# Patient Record
Sex: FEMALE | Employment: FULL TIME | URBAN - METROPOLITAN AREA
[De-identification: names, ages, dates, MRNs, and addresses within clinical notes are randomized per-mention and may not be internally consistent; named-entity substitution may affect disease eponyms.]

---

## 2017-12-12 PROBLEM — C43.71 MALIGNANT MELANOMA OF RIGHT LOWER EXTREMITY INCLUDING HIP (HCC): Status: ACTIVE | Noted: 2017-12-12

## 2017-12-12 PROCEDURE — 88175 CYTOPATH C/V AUTO FLUID REDO: CPT | Performed by: OBSTETRICS & GYNECOLOGY

## 2018-01-15 PROBLEM — R87.612 PAPANICOLAOU SMEAR OF CERVIX WITH LOW GRADE SQUAMOUS INTRAEPITHELIAL LESION (LGSIL): Status: ACTIVE | Noted: 2018-01-15

## 2018-01-15 PROCEDURE — 88305 TISSUE EXAM BY PATHOLOGIST: CPT | Performed by: OBSTETRICS & GYNECOLOGY

## 2018-01-22 PROBLEM — R87.612 PAPANICOLAOU SMEAR OF CERVIX WITH LOW GRADE SQUAMOUS INTRAEPITHELIAL LESION (LGSIL): Status: RESOLVED | Noted: 2018-01-15 | Resolved: 2018-01-22

## 2018-01-22 PROBLEM — N87.0 DYSPLASIA OF CERVIX, LOW GRADE (CIN 1): Status: ACTIVE | Noted: 2018-01-22

## 2019-07-01 PROCEDURE — 88175 CYTOPATH C/V AUTO FLUID REDO: CPT | Performed by: OBSTETRICS & GYNECOLOGY

## 2020-01-20 PROBLEM — M81.8 OTHER OSTEOPOROSIS WITHOUT CURRENT PATHOLOGICAL FRACTURE: Status: ACTIVE | Noted: 2020-01-20

## 2021-03-01 PROBLEM — M81.0 AGE-RELATED OSTEOPOROSIS WITHOUT CURRENT PATHOLOGICAL FRACTURE: Status: ACTIVE | Noted: 2021-03-01

## 2022-10-03 ENCOUNTER — WALK IN (OUTPATIENT)
Dept: URGENT CARE | Age: 67
End: 2022-10-03

## 2022-10-03 VITALS
BODY MASS INDEX: 21.99 KG/M2 | HEART RATE: 65 BPM | SYSTOLIC BLOOD PRESSURE: 118 MMHG | RESPIRATION RATE: 18 BRPM | WEIGHT: 132 LBS | OXYGEN SATURATION: 97 % | DIASTOLIC BLOOD PRESSURE: 80 MMHG | HEIGHT: 65 IN | TEMPERATURE: 97 F

## 2022-10-03 DIAGNOSIS — J06.9 UPPER RESPIRATORY TRACT INFECTION, UNSPECIFIED TYPE: Primary | ICD-10-CM

## 2022-10-03 LAB
FLUAV AG UPPER RESP QL IA.RAPID: NEGATIVE
FLUBV AG UPPER RESP QL IA.RAPID: NEGATIVE
INTERNAL PROCEDURAL CONTROLS ACCEPTABLE: YES
S PYO AG THROAT QL IA.RAPID: NEGATIVE
SARS-COV+SARS-COV-2 AG RESP QL IA.RAPID: NOT DETECTED
TEST LOT EXPIRATION DATE: NORMAL
TEST LOT EXPIRATION DATE: NORMAL
TEST LOT NUMBER: NORMAL
TEST LOT NUMBER: NORMAL

## 2022-10-03 PROCEDURE — 87428 SARSCOV & INF VIR A&B AG IA: CPT | Performed by: NURSE PRACTITIONER

## 2022-10-03 PROCEDURE — 87880 STREP A ASSAY W/OPTIC: CPT | Performed by: NURSE PRACTITIONER

## 2022-10-03 PROCEDURE — 99213 OFFICE O/P EST LOW 20 MIN: CPT | Performed by: NURSE PRACTITIONER

## 2022-10-03 RX ORDER — ROSUVASTATIN CALCIUM 5 MG/1
TABLET, COATED ORAL
COMMUNITY
Start: 2022-08-30

## 2022-10-03 RX ORDER — HYDROCHLOROTHIAZIDE 25 MG/1
25 TABLET ORAL
COMMUNITY
Start: 2022-04-21

## 2022-10-03 RX ORDER — PANTOPRAZOLE SODIUM 20 MG/1
20 TABLET, DELAYED RELEASE ORAL
COMMUNITY
Start: 2022-04-21

## 2022-10-03 RX ORDER — SERTRALINE HYDROCHLORIDE 100 MG/1
TABLET, FILM COATED ORAL
COMMUNITY
Start: 2022-08-30

## 2022-10-03 RX ORDER — BUPROPION HYDROCHLORIDE 150 MG/1
150 TABLET ORAL DAILY
COMMUNITY

## 2022-10-03 RX ORDER — LOSARTAN POTASSIUM 100 MG/1
100 TABLET ORAL
COMMUNITY
Start: 2022-04-21

## 2022-10-03 ASSESSMENT — ENCOUNTER SYMPTOMS
ACTIVITY CHANGE: 0
RHINORRHEA: 1
CHILLS: 0
DIZZINESS: 0
CHEST TIGHTNESS: 0
WHEEZING: 0
EYE DISCHARGE: 0
FACIAL SWELLING: 0
SORE THROAT: 1
BACK PAIN: 0
LIGHT-HEADEDNESS: 0
ABDOMINAL PAIN: 0
FEVER: 0
SINUS PRESSURE: 1
SINUS PAIN: 1
DIARRHEA: 0
COLOR CHANGE: 0
APPETITE CHANGE: 1
NAUSEA: 0
HEADACHES: 1
EYE REDNESS: 0
COUGH: 1
FATIGUE: 0
TROUBLE SWALLOWING: 0
EYE ITCHING: 0
DIAPHORESIS: 0
EYE PAIN: 0
VOMITING: 0
WEAKNESS: 0
SHORTNESS OF BREATH: 0
PHOTOPHOBIA: 0

## 2025-04-29 RX ORDER — ALPRAZOLAM 0.5 MG
0.5 TABLET ORAL
COMMUNITY
Start: 2024-08-14

## 2025-04-29 RX ORDER — PANTOPRAZOLE SODIUM 40 MG/1
40 TABLET, DELAYED RELEASE ORAL DAILY
COMMUNITY
Start: 2023-04-14

## 2025-04-29 RX ORDER — LOSARTAN POTASSIUM 100 MG/1
100 TABLET ORAL DAILY
COMMUNITY
Start: 2024-07-08

## 2025-04-29 RX ORDER — HYDROCHLOROTHIAZIDE 25 MG/1
25 TABLET ORAL DAILY
COMMUNITY
Start: 2024-07-06

## 2025-04-29 RX ORDER — THIAMINE HCL 100 MG
1 TABLET ORAL DAILY
COMMUNITY

## 2025-04-30 NOTE — DISCHARGE INSTRUCTIONS
HOME INSTRUCTIONS  [facility] HOME CARE INSTRUCTIONS: POST-OP ANESTHESIA  The medication that you received for sedation or general anesthesia can last up to 24 hours. Your judgment and reflexes may be altered, even if you feel like your normal self.      We Recommend:   Do not drive any motor vehicle or bicycle   Avoid mowing the lawn, playing sports, or working with power tools/applicances (power saws, electric knives or mixers)   That you have someone stay with you on your first night home   Do not drink alcohol or take sleeping pills or tranquilizers   Do not sign legal documents within 24 hours of your procedure   If you had a nerve block for your surgery, take extra care not to put any pressure on your arm or hand for 24 hours    It is normal:  For you to have a sore throat if you had a breathing tube during surgery (while you were asleep!). The sore throat should get better within 48 hours. You can gargle with warm salt water (1/2 tsp in 4 oz warm water) or use a throat lozenge for comfort  To feel muscle aches or soreness especially in the abdomen, chest or neck. The achy feeling should go away in the next 24 hours  To feel weak, sleepy or \"wiped out\". Your should start feeling better in the next 24 hours.   To experience mild discomforts such as sore lip or tongue, headache, cramps, gas pains or a bloated feeling in your abdomen.   To experience mild back pain or soreness for a day or two if you had spinal or epidural anesthesia.   If you had laparoscopic surgery, to feel shoulder pain or discomfort on the day of surgery.   For some patients to have nausea after surgery/anesthesia    If you feel nausea or experience vomiting:   Try to move around less.   Eat less than usual or drink only liquids until the next morning   Nausea should resolve in about 24 hours    If you have a problem when you are at home:    Call your surgeons office   Discharge Instructions: After Your Surgery  You’ve just had surgery. During  surgery, you were given medicine called anesthesia to keep you relaxed and free of pain. After surgery, you may have some pain or nausea. This is common. Here are some tips for feeling better and getting well after surgery.   Going home  Your healthcare provider will show you how to take care of yourself when you go home. They'll also answer your questions. Have an adult family member or friend drive you home. For the first 24 hours after your surgery:   Don't drive or use heavy equipment.  Don't make important decisions or sign legal papers.  Take medicines as directed.  Don't drink alcohol.  Have someone stay with you, if needed. They can watch for problems and help keep you safe.  Be sure to go to all follow-up visits with your healthcare provider. And rest after your surgery for as long as your provider tells you to.   Coping with pain  If you have pain after surgery, pain medicine will help you feel better. Take it as directed, before pain becomes severe. Also, ask your healthcare provider or pharmacist about other ways to control pain. This might be with heat, ice, or relaxation. And follow any other instructions your surgeon or nurse gives you.      Stay on schedule with your medicine.     Tips for taking pain medicine  To get the best relief possible, remember these points:   Pain medicines can upset your stomach. Taking them with a little food may help.  Most pain relievers taken by mouth need at least 20 to 30 minutes to start to work.  Don't wait till your pain becomes severe before you take your medicine. Try to time your medicine so that you can take it before starting an activity. This might be before you get dressed, go for a walk, or sit down for dinner.  Constipation is a common side effect of some pain medicines. Call your healthcare provider before taking any medicines, such as laxatives or stool softeners, to help ease constipation. Also ask if you should skip any foods. Drinking lots of fluids  and eating foods, such as fruits and vegetables, that are high in fiber can also help. Remember, don't take laxatives unless your surgeon has prescribed them.  Drinking alcohol and taking pain medicine can cause dizziness and slow your breathing. It can even be deadly. Don't drink alcohol while taking pain medicine.  Pain medicine can make you react more slowly to things. Don't drive or run machinery while taking pain medicine.  Your healthcare provider may tell you to take acetaminophen to help ease your pain. Ask them how much you're supposed to take each day. Acetaminophen or other pain relievers may interact with your prescription medicines or other over-the-counter (OTC) medicines. Some prescription medicines have acetaminophen and other ingredients in them. Using both prescription and OTC acetaminophen for pain can cause you to accidentally overdose. Read the labels on your OTC medicines with care. This will help you to clearly know the list of ingredients, how much to take, and any warnings. It may also help you not take too much acetaminophen. If you have questions or don't understand the information, ask your pharmacist or healthcare provider to explain it to you before you take the OTC medicine.   Managing nausea  Some people have an upset stomach (nausea) after surgery. This is often because of anesthesia, pain, or pain medicine, less movement of food in the stomach, or the stress of surgery. These tips will help you handle nausea and eat healthy foods as you get better. If you were on a special food plan before surgery, ask your healthcare provider if you should follow it while you get better. Check with your provider on how your eating should progress. It may depend on the surgery you had. These general tips may help:   Don't push yourself to eat. Your body will tell you when to eat and how much.  Start off with clear liquids and soup. They're easier to digest.  Next try semi-solid foods as you feel  ready. These include mashed potatoes, applesauce, and gelatin.  Slowly move to solid foods. Don’t eat fatty, rich, or spicy foods at first.  Don't force yourself to have 3 large meals a day. Instead eat smaller amounts more often.  Take pain medicines with a small amount of solid food, such as crackers or toast. This helps prevent nausea.  When to call your healthcare provider  Call your healthcare provider right away if you have any of these:   You still have too much pain, or the pain gets worse, after taking the medicine. The medicine may not be strong enough. Or there may be a complication from the surgery.  You feel too sleepy, dizzy, or groggy. The medicine may be too strong.  Side effects, such as nausea or vomiting. Your healthcare provider may advise taking other medicines to treat these or may change your treatment plan..  Skin changes, such as rash, itching, or hives. This may mean you have an allergic reaction. Your provider may advise taking other medicines.  The incision looks different (for instance, part of it opens up).  Bleeding or fluid leaking from the incision site, and you weren't told to expect that.  Fever of 100.4°F (38°C) or higher, or as directed by your healthcare provider.  Call 911  Call 911 right away if you have:   Trouble breathing  Facial swelling    If you have obstructive sleep apnea   You were given anesthesia medicine during surgery to keep you comfortable and free of pain. After surgery, you may have more apnea spells because of this medicine and other medicines you were given. The spells may last longer than normal.    At home:  Keep using the continuous positive airway pressure (CPAP) device when you sleep. Unless your healthcare provider tells you not to, use it when you sleep, day or night. CPAP is a common device used to treat obstructive sleep apnea.  Talk with your provider before taking any pain medicine, muscle relaxants, or sedatives. Your provider will tell you about  the possible dangers of taking these medicines.  Contact your provider if your sleeping changes a lot even when taking medicines as directed.  [employer] last reviewed this educational content on 4/1/2024  This information is for informational purposes only. This is not intended to be a substitute for professional medical advice, diagnosis, or treatment. Always seek the advice and follow the directions from your physician or other qualified health care provider.  © 0245-0719 [employer]. All rights reserved. This information is not intended as a substitute for professional medical care. Always follow your healthcare professional's instructions.

## 2025-05-01 ENCOUNTER — ANESTHESIA EVENT (OUTPATIENT)
Dept: SURGERY | Facility: HOSPITAL | Age: 70
End: 2025-05-01
Payer: MEDICARE

## 2025-05-01 ENCOUNTER — HOSPITAL ENCOUNTER (OUTPATIENT)
Facility: HOSPITAL | Age: 70
Setting detail: HOSPITAL OUTPATIENT SURGERY
Discharge: HOME | End: 2025-05-01
Attending: ORTHOPAEDIC SURGERY | Admitting: ORTHOPAEDIC SURGERY
Payer: MEDICARE

## 2025-05-01 ENCOUNTER — ANESTHESIA (OUTPATIENT)
Dept: SURGERY | Facility: HOSPITAL | Age: 70
End: 2025-05-01
Payer: MEDICARE

## 2025-05-01 VITALS
HEART RATE: 53 BPM | TEMPERATURE: 97 F | HEIGHT: 64 IN | WEIGHT: 140 LBS | DIASTOLIC BLOOD PRESSURE: 42 MMHG | RESPIRATION RATE: 14 BRPM | SYSTOLIC BLOOD PRESSURE: 151 MMHG | OXYGEN SATURATION: 97 % | BODY MASS INDEX: 23.9 KG/M2

## 2025-05-01 PROCEDURE — 64415 NJX AA&/STRD BRCH PLXS IMG: CPT | Performed by: ORTHOPAEDIC SURGERY

## 2025-05-01 RX ORDER — ONDANSETRON 2 MG/ML
4 INJECTION INTRAMUSCULAR; INTRAVENOUS EVERY 6 HOURS PRN
Status: DISCONTINUED | OUTPATIENT
Start: 2025-05-01 | End: 2025-05-01

## 2025-05-01 RX ORDER — HYDROMORPHONE HYDROCHLORIDE 1 MG/ML
0.4 INJECTION, SOLUTION INTRAMUSCULAR; INTRAVENOUS; SUBCUTANEOUS EVERY 2 HOUR PRN
Refills: 0 | Status: DISCONTINUED | OUTPATIENT
Start: 2025-05-01 | End: 2025-05-01

## 2025-05-01 RX ORDER — OXYCODONE HYDROCHLORIDE 5 MG/1
5 TABLET ORAL EVERY 4 HOURS PRN
Refills: 0 | Status: DISCONTINUED | OUTPATIENT
Start: 2025-05-01 | End: 2025-05-01

## 2025-05-01 RX ORDER — MIDAZOLAM HYDROCHLORIDE 1 MG/ML
INJECTION INTRAMUSCULAR; INTRAVENOUS AS NEEDED
Status: DISCONTINUED | OUTPATIENT
Start: 2025-05-01 | End: 2025-05-01 | Stop reason: SURG

## 2025-05-01 RX ORDER — MORPHINE SULFATE 10 MG/ML
6 INJECTION, SOLUTION INTRAMUSCULAR; INTRAVENOUS EVERY 10 MIN PRN
Status: DISCONTINUED | OUTPATIENT
Start: 2025-05-01 | End: 2025-05-01

## 2025-05-01 RX ORDER — HYDROMORPHONE HYDROCHLORIDE 1 MG/ML
0.8 INJECTION, SOLUTION INTRAMUSCULAR; INTRAVENOUS; SUBCUTANEOUS EVERY 2 HOUR PRN
Refills: 0 | Status: DISCONTINUED | OUTPATIENT
Start: 2025-05-01 | End: 2025-05-01

## 2025-05-01 RX ORDER — MAGNESIUM HYDROXIDE 1200 MG/15ML
LIQUID ORAL CONTINUOUS PRN
Status: DISCONTINUED | OUTPATIENT
Start: 2025-05-01 | End: 2025-05-01

## 2025-05-01 RX ORDER — MORPHINE SULFATE 4 MG/ML
4 INJECTION, SOLUTION INTRAMUSCULAR; INTRAVENOUS EVERY 10 MIN PRN
Status: DISCONTINUED | OUTPATIENT
Start: 2025-05-01 | End: 2025-05-01

## 2025-05-01 RX ORDER — SODIUM CHLORIDE, SODIUM LACTATE, POTASSIUM CHLORIDE, CALCIUM CHLORIDE 600; 310; 30; 20 MG/100ML; MG/100ML; MG/100ML; MG/100ML
INJECTION, SOLUTION INTRAVENOUS CONTINUOUS
Status: DISCONTINUED | OUTPATIENT
Start: 2025-05-01 | End: 2025-05-01

## 2025-05-01 RX ORDER — OXYCODONE HYDROCHLORIDE 5 MG/1
10 TABLET ORAL EVERY 4 HOURS PRN
Refills: 0 | Status: DISCONTINUED | OUTPATIENT
Start: 2025-05-01 | End: 2025-05-01

## 2025-05-01 RX ORDER — HYDROMORPHONE HYDROCHLORIDE 1 MG/ML
0.6 INJECTION, SOLUTION INTRAMUSCULAR; INTRAVENOUS; SUBCUTANEOUS EVERY 5 MIN PRN
Status: DISCONTINUED | OUTPATIENT
Start: 2025-05-01 | End: 2025-05-01

## 2025-05-01 RX ORDER — HYDROMORPHONE HYDROCHLORIDE 1 MG/ML
0.2 INJECTION, SOLUTION INTRAMUSCULAR; INTRAVENOUS; SUBCUTANEOUS EVERY 5 MIN PRN
Status: DISCONTINUED | OUTPATIENT
Start: 2025-05-01 | End: 2025-05-01

## 2025-05-01 RX ORDER — MORPHINE SULFATE 4 MG/ML
2 INJECTION, SOLUTION INTRAMUSCULAR; INTRAVENOUS EVERY 10 MIN PRN
Status: DISCONTINUED | OUTPATIENT
Start: 2025-05-01 | End: 2025-05-01

## 2025-05-01 RX ORDER — NALOXONE HYDROCHLORIDE 0.4 MG/ML
0.08 INJECTION, SOLUTION INTRAMUSCULAR; INTRAVENOUS; SUBCUTANEOUS AS NEEDED
Status: DISCONTINUED | OUTPATIENT
Start: 2025-05-01 | End: 2025-05-01

## 2025-05-01 RX ORDER — ROPIVACAINE HYDROCHLORIDE 5 MG/ML
INJECTION, SOLUTION EPIDURAL; INFILTRATION; PERINEURAL
Status: COMPLETED | OUTPATIENT
Start: 2025-05-01 | End: 2025-05-01

## 2025-05-01 RX ORDER — KETAMINE HYDROCHLORIDE 50 MG/ML
INJECTION, SOLUTION INTRAMUSCULAR; INTRAVENOUS AS NEEDED
Status: DISCONTINUED | OUTPATIENT
Start: 2025-05-01 | End: 2025-05-01 | Stop reason: SURG

## 2025-05-01 RX ORDER — ACETAMINOPHEN 500 MG
1000 TABLET ORAL ONCE
Status: COMPLETED | OUTPATIENT
Start: 2025-05-01 | End: 2025-05-01

## 2025-05-01 RX ORDER — HYDROMORPHONE HYDROCHLORIDE 1 MG/ML
0.4 INJECTION, SOLUTION INTRAMUSCULAR; INTRAVENOUS; SUBCUTANEOUS EVERY 5 MIN PRN
Status: DISCONTINUED | OUTPATIENT
Start: 2025-05-01 | End: 2025-05-01

## 2025-05-01 RX ADMIN — ROPIVACAINE HYDROCHLORIDE 30 ML: 5 INJECTION, SOLUTION EPIDURAL; INFILTRATION; PERINEURAL at 07:07:00

## 2025-05-01 RX ADMIN — MIDAZOLAM HYDROCHLORIDE 2 MG: 1 INJECTION INTRAMUSCULAR; INTRAVENOUS at 07:15:00

## 2025-05-01 RX ADMIN — KETAMINE HYDROCHLORIDE 50 MG: 50 INJECTION, SOLUTION INTRAMUSCULAR; INTRAVENOUS at 07:21:00

## 2025-05-01 NOTE — ANESTHESIA POSTPROCEDURE EVALUATION
Patient: Marce Tate    Procedure Summary       Date: 05/01/25 Room / Location: [facility] MAIN OR  / [facility] MAIN OR    Anesthesia Start: 0715 Anesthesia Stop: 0815    Procedure: Right carpometacarpal arthroplasty with tendon transfer (Right: Hand) Diagnosis: (Right carpometacarpal osteoarthritis)    Surgeons: Raul Erickson MD Anesthesiologist: Jd Thurston MD    Anesthesia Type: MAC, regional ASA Status: 3            Anesthesia Type: MAC, regional    Vitals Value Taken Time   /46 05/01/25 08:38   Temp 97 °F (36.1 °C) 05/01/25 08:07   Pulse 47 05/01/25 08:41   Resp 14 05/01/25 08:41   SpO2 98 % 05/01/25 08:41   Vitals shown include unfiled device data.    [facility] AN Post Evaluation:   Patient Evaluated in PACU  Patient Participation: complete - patient participated  Level of Consciousness: awake and awake and alert  Pain Score: 2  Pain Management: adequate  Airway Patency:patent  Dental exam unchanged from preop  Yes    Nausea/Vomiting: none  Cardiovascular Status: acceptable, blood pressure returned to baseline and hemodynamically stable  Respiratory Status: acceptable, unassisted and spontaneous ventilation  Postoperative Hydration stable      Jd Thurston MD  5/1/2025 8:44 AM

## 2025-05-01 NOTE — ANESTHESIA PROCEDURE NOTES
Peripheral Block    Date/Time: 5/1/2025 7:07 AM    Performed by: Jd Thurston MD  Authorized by: Jd Thurston MD      General Information and Staff    Start Time:  5/1/2025 7:07 AM  End Time:  5/1/2025 7:10 AM  Anesthesiologist:  Jd Thurston MD  Performed by:  Anesthesiologist  Patient Location:  PACU    Block Placement: Pre Induction  Site Identification: real time ultrasound guided, surface landmarks and image stored and retrievable      Reason for Block: at surgeon's request and post-op pain management    Preanesthetic Checklist: 2 patient identifers, IV checked, site marked, risks and benefits discussed, monitors and equipment checked, pre-op evaluation, timeout performed, anesthesia consent, sterile technique used, no prohibitive neurological deficits and no local skin infection at insertion site      Procedure Details    Patient Position:  Sitting  Prep: ChloraPrep    Monitoring:  Cardiac monitor  Block Type:  Supraclavicular  Laterality:  Right  Injection Technique:  Single-shot    Needle    Needle Type:  Short-bevel  Needle Gauge:  22 G  Needle Length:  50 mm  Needle Localization:  Ultrasound guidance and anatomical landmarks  Reason for Ultrasound Use: appropriate spread of the medication was noted in real time and no ultrasound evidence of intravascular and/or intraneural injection            Assessment    Injection Assessment:  Good spread noted, incremental injection, local visualized surrounding nerve on ultrasound, low pressure, negative aspiration for heme, no pain on injection and negative resistance  Heart Rate Change: No        Medications  5/1/2025 7:07 AM  ropivacaine (NAROPIN) injection 0.5% - Infiltration   30 mL - 5/1/2025 7:07:00 AM    Additional Comments

## 2025-05-01 NOTE — OR PREOP
Had to go to arrival desk, color change is not happing to WR, Pt is here will call in to pre-op room

## 2025-05-01 NOTE — H&P
[facility]  part of [facility]    History and Physical    Marce Tate Patient Status:  Hospital Outpatient Surgery    1955 MRN [id number]   Location [facility] POST ANESTHESIA CARE UNIT Attending Raul Erickson MD   Hosp Day # 0 JD [facility]ARGAS     Date:  2025  Date of Admission:  2025    History provided by:patient  HPI:   No chief complaint on file.    Marce is a 70-year-old female presenting with right wrist and thumb pain secondary to 1st CMC joint osteoarthritis.        History   Past Medical History[1]  Past Surgical History[2]  Family History[3]  Social History:  Short Social Hx on File[4]  Allergies/Medications:   Allergies: Allergies[5]  Prescriptions Prior to Admission[6]    Review of Systems:     Constitutional:  Negative for activity change, chills and fever.   HENT:  Negative for drooling, facial swelling and nosebleeds.    Eyes:  Negative for pain, discharge and redness.   Respiratory:  Negative for chest tightness, shortness of breath and wheezing.    Cardiovascular:  Negative for chest pain, palpitations and leg swelling.   Gastrointestinal:  Negative for abdominal pain, constipation and blood in stool.   Genitourinary:  Negative for dysuria, flank pain and difficulty urinating.   Musculoskeletal:  Positive for joint swelling (right 1st CMC joint) and joint pain (right 1st CMC joint). Negative for gait problem.   Skin:  Negative for color change, pallor and rash.   Neurological:  Negative for dizziness, facial asymmetry and light-headedness.   Psychiatric/Behavioral:  Negative for behavioral problems, confusion and depressed mood.        Physical Exam:   Vital Signs:  Blood pressure 141/48, pulse (!) 45, temperature 97.9 °F (36.6 °C), temperature source Oral, resp. rate 18, height 5' 4\" (1.626 m), weight 140 lb (63.5 kg), SpO2 95%, not currently breastfeeding.  Physical Exam  Vitals reviewed.   Constitutional:       General: She is not in acute  distress.     Appearance: Normal appearance. She is not ill-appearing.   HENT:      Head: Normocephalic and atraumatic.      Right Ear: External ear normal.      Left Ear: External ear normal.      Nose: Nose normal.   Eyes:      Extraocular Movements: Extraocular movements intact.   Cardiovascular:      Rate and Rhythm: Normal rate.   Pulmonary:      Effort: Pulmonary effort is normal.   Musculoskeletal:         General: Tenderness (right 1st CMC joint) present.      Right lower leg: No edema.      Left lower leg: No edema.   Skin:     General: Skin is warm and dry.      Capillary Refill: Capillary refill takes less than 2 seconds.      Findings: No erythema or rash.   Neurological:      General: No focal deficit present.      Mental Status: She is alert and oriented to person, place, and time.   Psychiatric:         Mood and Affect: Mood normal.         Behavior: Behavior normal.         Thought Content: Thought content normal.         Judgment: Judgment normal.       Cervical Papanicolaou to be done in MD's office    Results:     Lab Results   Component Value Date    CREATSERUM 0.62 03/01/2021    CA 9.3 03/01/2021    ALB 4.2 03/01/2021     No results found.        Assessment/Plan:     * No active hospital problems. *    Marce is a 70-year-old female presenting with right wrist and thumb pain secondary to 1st CMC joint osteoarthritis.      Plan for surgical right thumb CMC joint arthroplasty with bone grafting and tendon transfer.    Kareen Callahan PA-C  5/1/2025         [1]   Past Medical History:   Anxiety    Depression    seeing a counselor    Disorder of thyroid    nodules    Esophageal reflux    Essential hypertension    Familial hypercholesterolemia    Heart valve disease    Needs aortic valve replacement    High blood pressure    High cholesterol    Incontinence    bladder    Nonrheumatic mitral (valve) insufficiency    Osteoarthritis    Osteoporosis    Visual impairment    contacts   [2]   Past Surgical  History:  Procedure Laterality Date          X1    Colonoscopy      Hand surgery Right     tendonitis    Other surgical history  2017    melonoma on leg    Tonsillectomy     [3]   Family History  Problem Relation Age of Onset    Pulmonary Disease Father         lung disease    Heart Disease Mother     Cancer Maternal Grandmother         breast   [4]   Social History  Socioeconomic History    Marital status:    Tobacco Use    Smoking status: Former     Current packs/day: 0.00     Types: Cigarettes     Quit date: 3/4/2020     Years since quittin.1    Smokeless tobacco: Never   Vaping Use    Vaping status: Never Used   Substance and Sexual Activity    Alcohol use: No    Drug use: No   [5]   Allergies  Allergen Reactions    Penicillins HIVES     Denies blisters, skin peeling, or organ damage.     Ampicillin HIVES   [6]   Medications Prior to Admission   Medication Sig    losartan 100 MG Oral Tab Take 1 tablet (100 mg total) by mouth daily.    hydroCHLOROthiazide 25 MG Oral Tab Take 1 tablet (25 mg total) by mouth daily.    ALPRAZolam 0.5 MG Oral Tab Take 1 tablet (0.5 mg total) by mouth daily as needed.    Biotin 2.5 MG Oral Cap Take 2,500 mcg by mouth daily.    Calcium Citrate-Vitamin D3 315-6.25 MG-MCG Oral Tab Take 1 tablet by mouth daily.    pantoprazole 40 MG Oral Tab EC Take 1 tablet (40 mg total) by mouth daily.    buPROPion HCl ER, XL, 150 MG Oral Tablet 24 Hr Take 1 tablet (150 mg total) by mouth every morning.    Sertraline HCl 100 MG Oral Tab Take 1 tablet (100 mg total) by mouth in the morning.    Rosuvastatin Calcium 5 MG Oral Tab Take 1 tablet (5 mg total) by mouth in the morning.

## 2025-05-01 NOTE — ANESTHESIA PREPROCEDURE EVALUATION
Anesthesia PreOp Note    HPI:     Marce Tate is a 70 year old female who presents for preoperative consultation requested by: Raul Erickson MD    Date of Surgery: 5/1/2025    Procedure(s):  Right carpometacarpal arthroplasty with tendon transfer  Indication: Right carpometacarpal osteoarthritis    Relevant Problems   No relevant active problems       NPO:  Last Liquid Consumption Date: 04/30/25  Last Liquid Consumption Time: 2100  Last Solid Consumption Date: 04/30/25  Last Solid Consumption Time: 1900  Last Liquid Consumption Date: 04/30/25          History Review:  Patient Active Problem List    Diagnosis Date Noted    Age-related osteoporosis without current pathological fracture 03/01/2021    Other osteoporosis without current pathological fracture 01/20/2020    Other osteoporosis without current pathological fracture 01/20/2020    Dysplasia of cervix, low grade (JAMEY 1) 01/22/2018    Malignant melanoma of right lower extremity including hip (HCC) 12/12/2017       Past Medical History[1]    Past Surgical History[2]    Prescriptions Prior to Admission[3]  Current Medications and Prescriptions Ordered in Epic[4]    Allergies[5]    Family History[6]  Social Hx on file[7]    Available pre-op labs reviewed.             Vital Signs:  Body mass index is 24.03 kg/m².   height is 1.626 m (5' 4\") and weight is 63.5 kg (140 lb). Her oral temperature is 97.9 °F (36.6 °C). Her blood pressure is 167/78 (abnormal) and her pulse is 50. Her respiration is 17 and oxygen saturation is 97%.   Vitals:    05/01/25 0641 05/01/25 0651 05/01/25 0701 05/01/25 0711   BP: 146/54 141/56 148/59 (!) 167/78   Pulse: (!) 46 (!) 45 (!) 49 50   Resp: 17 16 16 17   Temp:       TempSrc:       SpO2: 95% 95% 96% 97%   Weight:       Height:            Anesthesia Evaluation     Patient summary reviewed and Nursing notes reviewed    Airway   Mallampati: I  TM distance: >3 FB  Neck ROM: full  Dental - Dentition appears grossly intact     Pulmonary  - normal exam    breath sounds clear to auscultation  Cardiovascular   Exercise tolerance: good  (+) hypertension poorly controlled    Rhythm: regular  Rate: normal    Neuro/Psych    (+)  anxiety/panic attacks,  depression      GI/Hepatic/Renal    (+) GERD    Endo/Other    Abdominal                  Anesthesia Plan:   ASA:  3  Plan:   MAC and regional  Post-op Pain Management: Supraclavicular block  Informed Consent Plan and Risks Discussed With:  Patient      I have informed Marce Tate and/or legal guardian or family member of the nature of the anesthetic plan, benefits, risks including possible dental damage if relevant, major complications, and any alternative forms of anesthetic management.   All of the patient's questions were answered to the best of my ability. The patient desires the anesthetic management as planned.  Jd Thurston MD  2025 7:16 AM  Present on Admission:  **None**           [1]   Past Medical History:   Anxiety    Depression    seeing a counselor    Disorder of thyroid    nodules    Esophageal reflux    Essential hypertension    Familial hypercholesterolemia    Heart valve disease    Needs aortic valve replacement    High blood pressure    High cholesterol    Incontinence    bladder    Nonrheumatic mitral (valve) insufficiency    Osteoarthritis    Osteoporosis    Visual impairment    contacts   [2]   Past Surgical History:  Procedure Laterality Date          X1    Colonoscopy      Hand surgery Right     tendonitis    Other surgical history  2017    melonoma on leg    Tonsillectomy     [3]   Medications Prior to Admission   Medication Sig Dispense Refill Last Dose/Taking    losartan 100 MG Oral Tab Take 1 tablet (100 mg total) by mouth daily.   2025 Morning    hydroCHLOROthiazide 25 MG Oral Tab Take 1 tablet (25 mg total) by mouth daily.   2025 Morning    ALPRAZolam 0.5 MG Oral Tab Take 1 tablet (0.5 mg total) by mouth daily as needed.   2025 Evening    Biotin  2.5 MG Oral Cap Take 2,500 mcg by mouth daily.   Past Week    Calcium Citrate-Vitamin D3 315-6.25 MG-MCG Oral Tab Take 1 tablet by mouth daily.   Past Week    pantoprazole 40 MG Oral Tab EC Take 1 tablet (40 mg total) by mouth daily.   2025 Morning    buPROPion HCl ER, XL, 150 MG Oral Tablet 24 Hr Take 1 tablet (150 mg total) by mouth every morning.   2025 Morning    Sertraline HCl 100 MG Oral Tab Take 1 tablet (100 mg total) by mouth in the morning.   2025 Morning    Rosuvastatin Calcium 5 MG Oral Tab Take 1 tablet (5 mg total) by mouth in the morning.   2025 Morning   [4]   Current Facility-Administered Medications Ordered in Epic   Medication Dose Route Frequency Provider Last Rate Last Admin    lactated ringers infusion   Intravenous Continuous Raul Erickson MD 20 mL/hr at 25 0629 New Bag at 25 0629    ondansetron (Zofran) 4 MG/2ML injection 4 mg  4 mg Intravenous Q6H PRN Kareen Callahan PA-C        oxyCODONE immediate release tab 5 mg  5 mg Oral Q4H PRN Kareen Callahan PA-C        Or    oxyCODONE immediate release tab 10 mg  10 mg Oral Q4H PRN Kareen Callahan PA-C        HYDROmorphone (Dilaudid) 1 MG/ML injection 0.4 mg  0.4 mg Intravenous Q2H PRN Kareen Callahan PA-C        Or    HYDROmorphone (Dilaudid) 1 MG/ML injection 0.8 mg  0.8 mg Intravenous Q2H PRN Kareen Callahan PA-C         No current [facility]-ordered outpatient medications on file.   [5]   Allergies  Allergen Reactions    Penicillins HIVES     Denies blisters, skin peeling, or organ damage.     Ampicillin HIVES   [6]   Family History  Problem Relation Age of Onset    Pulmonary Disease Father         lung disease    Heart Disease Mother     Cancer Maternal Grandmother         breast   [7]   Social History  Socioeconomic History    Marital status:    Tobacco Use    Smoking status: Former     Current packs/day: 0.00     Types: Cigarettes     Quit date: 3/4/2020     Years since quittin.1    Smokeless tobacco: Never   Vaping  Use    Vaping status: Never Used   Substance and Sexual Activity    Alcohol use: No    Drug use: No

## 2025-05-28 NOTE — OPERATIVE REPORT
Pre-Operative Diagnosis: Right carpometacarpal osteoarthritis     Post-Operative Diagnosis: Right carpometacarpal osteoarthritis      Procedure Performed:   Right carpometacarpal arthroplasty with tendon transfer    Surgeons and Role:     * Raul Erickson MD - Primary    Assistant(s):  PA: Kareen Callahan PA-C     Surgical Findings: Endstage CMC OA     Specimen: none     Estimated Blood Loss: No data recorded    Indication:   Marce has had a long-standing history of right thumb pain.  She has  failed conservative therapy including both day and night bracing as well as multiple corticosteroid injections at the CMC joint.   Conversation was had regarding CMC arthroplasty with tendon interposition involving FCR transfer.  The risks include but are not limited to infection, damage to nerves and vessels, stiffness, weakness in thumb, cosmetic deformity, repeat surgery and persistent pain.      Procedure:  The patient was identified in the preoperative holding area and the right upper extremity was marked.  She underwent regional anesthesia for postoperative pain control without any complication.  The patient was brought to the operating room, placed on the table in supine position, and the anesthesia service induced general anesthesia.  Right arm was prepped and draped in the usual manner and tourniquet was elevated to 250 mmHg for less than 1 hour.   A longitudinal incision was made over the right CMC joint. Dissection was taken down through the skin and subcutaneous tissue. The skin flaps were elevated. The dorsal branch of radial artery was identified and protected. Radial sensory nerve branches were identified and protected. The first dorsal compartment was released proximally. The interval between EPB and APL was opened distally. A longitudinal capsulotomy of the ST and CMC joint was performed and the capsular flaps were elevated. A trapeziectomy was performed in a piecemeal fashion.   The FCR was harvested via one  short horizontal volar incisions 8-10 cm proximally. The FCR was transected and released proximally and then retrieved down to the index metacarpal base in the CMC joint region.   A drill hole was made in the thumb metacarpal base dorsally, starting 1cm distal to the proximal base, and into the proximal volar articular surface. The FCR tendon was passed from volar through the hole in the metacarpal base to dorsal and then brought back to itself in the CMC joint and sutured with 3- ethibond to itself and the distal FCR completing the tendon transfer and suspensionplasty.   The deep suture in the FCR was passed through the remaining FCR tendon and the remaining FCR was slid down this suture creating a ball. This FCR ball was sutured in place in the base of the CMC joint creating an interposition arthroplasty.   Bone graft was harvested from the trapezium. This was impacted into the void of the metacarpal. This allowed for good fill.   Dorsal capsular closure was made with 3-0 ethibond suture, infolded redundant dorsal capsule while the thumb was held in an abducted position. Subcutaneous closure was made with 3-0 Monocryl suture and subcuticular closure with 4-0  Monocryl. Incisions were dressed with Steri-Strips, Xeroform, sterile gauze, and a plaster-reinforced thumb spica splint. An ice pack was applied. Patient was taken to the recovery room in stable condition.   The patient will follow up in 7-10 days skin evaluation suture removal and placement in a thumb spica cast.  ESTIMATED BLOOD LOSS: Minimal.   DRAINS: None.   SPECIMENS: None.       Raul Erickson MD

## (undated) DEVICE — SUT MCRYL 3-0 27IN ABSRB UD L24MM PS-1

## (undated) DEVICE — SUT ETHBND XL 3-0 30IN RB-1 NABSRB GRN 17MM 1

## (undated) DEVICE — BNDG,ELSTC,MATRIX,STRL,2"X5YD,LF,HOOK&LP: Brand: MEDLINE

## (undated) DEVICE — BANDAGE COMPR 4INX12FT E DISP ESMARCH EVEN

## (undated) DEVICE — SUTURE ETHBND XL 2-0 30IN NABSRB GRN 26MM SH 1/2 CIR

## (undated) DEVICE — SUT ETHLN 4-0 18IN PS-2 NABSRB BLK 19MM 3/8 C

## (undated) DEVICE — OCCLUSIVE GAUZE STRIP,3% BISMUTH TRIBROMOPHENATE IN PETROLATUM BLEND: Brand: XEROFORM

## (undated) DEVICE — STANDARD HYPODERMIC NEEDLE,POLYPROPYLENE HUB: Brand: MONOJECT

## (undated) DEVICE — PACK CDS UPPER EXTREMITY

## (undated) DEVICE — 1010 S-DRAPE TOWEL DRAPE 10/BX: Brand: STERI-DRAPE™

## (undated) DEVICE — HEWSON SUTURE RETRIEVER: Brand: HEWSON SUTURE RETRIEVER

## (undated) DEVICE — SUT PROL 4-0 18IN PS-2 NABSRB BLU L19MM 3/8 C

## (undated) DEVICE — PADDING CAST 2INX4YD ST COHESIVE LP

## (undated) DEVICE — SLING ARM L L20IN D7IN DK BLU COT POLY WIDE

## (undated) DEVICE — SUT ETHBND XL 3-0 36IN SH NABSRB GRN 26MM 1/2

## (undated) DEVICE — SOLUTION IRRIG 1000ML 0.9% NACL USP BTL

## (undated) DEVICE — MATERIAL SPLNT 3X12IN POLY CMFRT PRE CUT

## (undated) DEVICE — SUT ETHBND XL 2-0 30IN CT-1 NABSRB GRN 36MM 1

## (undated) DEVICE — DRAPE C ARM W54XL78IN FOR FLROSCN

## (undated) DEVICE — GAMMEX® PI HYBRID SIZE 7.5, STERILE POWDER-FREE SURGICAL GLOVE, POLYISOPRENE AND NEOPRENE BLEND: Brand: GAMMEX

## (undated) DEVICE — TETRA-FLEX CF WOVEN LATEX FREE ELASTIC BANDAGE 2" X 5.5 YD: Brand: TETRA-FLEX™CF

## (undated) DEVICE — GAMMEX® PI HYBRID SIZE 8, STERILE POWDER-FREE SURGICAL GLOVE, POLYISOPRENE AND NEOPRENE BLEND: Brand: GAMMEX

## (undated) DEVICE — SLING ORTH L16.5IN D7IN M DK BLU POLY COT ARM

## (undated) DEVICE — CABLE BPLR L12FT FLYING LD DISP

## (undated) DEVICE — DISPOSABLE TOURNIQUET CUFF SINGLE BLADDER, DUAL PORT AND QUICK CONNECT CONNECTOR: Brand: COLOR CUFF

## (undated) DEVICE — 4.0MM EGG BUR

## (undated) DEVICE — PADDING,UNDERCAST,COTTON, 3X4YD STERILE: Brand: MEDLINE

## (undated) DEVICE — BANDGE GZ 2X75IN 1 PLY RAYON CONF KLING